# Patient Record
Sex: MALE | Race: WHITE | NOT HISPANIC OR LATINO | URBAN - NONMETROPOLITAN AREA
[De-identification: names, ages, dates, MRNs, and addresses within clinical notes are randomized per-mention and may not be internally consistent; named-entity substitution may affect disease eponyms.]

---

## 2017-01-03 ENCOUNTER — TRANSCRIBE ORDERS (OUTPATIENT)
Dept: PODIATRY | Facility: CLINIC | Age: 52
End: 2017-01-03

## 2017-01-03 DIAGNOSIS — L60.3 DYSTROPHIC NAIL: Primary | ICD-10-CM

## 2017-01-19 ENCOUNTER — OFFICE VISIT (OUTPATIENT)
Dept: PODIATRY | Facility: CLINIC | Age: 52
End: 2017-01-19

## 2017-01-19 VITALS
HEIGHT: 68 IN | SYSTOLIC BLOOD PRESSURE: 115 MMHG | HEART RATE: 88 BPM | BODY MASS INDEX: 27.1 KG/M2 | OXYGEN SATURATION: 97 % | DIASTOLIC BLOOD PRESSURE: 71 MMHG | WEIGHT: 178.8 LBS | TEMPERATURE: 98.8 F

## 2017-01-19 DIAGNOSIS — B35.1 ONYCHOMYCOSIS: Primary | ICD-10-CM

## 2017-01-19 DIAGNOSIS — B35.3 TINEA PEDIS OF BOTH FEET: ICD-10-CM

## 2017-01-19 DIAGNOSIS — M79.674 PAIN OF TOE OF RIGHT FOOT: ICD-10-CM

## 2017-01-19 PROCEDURE — 99203 OFFICE O/P NEW LOW 30 MIN: CPT | Performed by: PODIATRIST

## 2017-01-19 RX ORDER — TERBINAFINE HYDROCHLORIDE 250 MG/1
250 TABLET ORAL DAILY
Qty: 84 TABLET | Refills: 0 | Status: SHIPPED | OUTPATIENT
Start: 2017-01-19

## 2017-01-19 NOTE — PROGRESS NOTES
"Win Romero  1965  51 y.o. male   Patient presents today with an ingrown toenail on the right great toe.     01/19/2017  Chief Complaint   Patient presents with   • Right Foot - Ingrown Toenail           History of Present Illness    Mr Romero is a 52 y/o male who presents for evaluation of possible right hallux ingrown toe nail. He states that it has become increasingly thick and dystrophic over the past few years. It does cause some pain on pressure from shoe gear. He denies any acute inflammation or drainage. He does also have a history of athletes foot which failed OTC treatments and he has recently been given Rx topical cream. Any pain is relieved with rest.        No past medical history on file.      No past surgical history on file.      No family history on file.      Social History     Social History   • Marital status:      Spouse name: N/A   • Number of children: N/A   • Years of education: N/A     Occupational History   • Not on file.     Social History Main Topics   • Smoking status: Former Smoker     Types: Cigarettes, Cigars   • Smokeless tobacco: Not on file   • Alcohol use Not on file   • Drug use: Not on file   • Sexual activity: Not on file     Other Topics Concern   • Not on file     Social History Narrative   • No narrative on file         Current Outpatient Prescriptions   Medication Sig Dispense Refill   • terbinafine (lamiSIL) 250 MG tablet Take 1 tablet by mouth Daily. 84 tablet 0     No current facility-administered medications for this visit.          OBJECTIVE    Visit Vitals   • /71   • Pulse 88   • Temp 98.8 °F (37.1 °C)   • Ht 68\" (172.7 cm)   • Wt 178 lb 12.8 oz (81.1 kg)   • SpO2 97%   • BMI 27.19 kg/m2         Review of Systems   Constitutional:  Denies recent weight loss, weight gain, fever or chills, no change in exercise tolerance  Musculoskeletal: Toe pain.   Skin:  Thickened nails. Plantar foot rash.  Neurological: Denies " paresthesias.  Psychiatric/Behavioral: Denies depression    Physical Exam   Constitutional: he appears well-developed and well-nourished.   HEENT: Normocephalic. Atraumatic  CV: No tenderness. RRR  Resp: Non-labored respiration. No wheezes.   Psychiatric: he has a normal mood and affect. his   behavior is normal.      Lower Extremity Exam:  Vascular: DP/PT pulses palpable 2+.   Positive hair growth.   No edema  Toes warm  Neuro: Protective sensation diminished to lesser toes, b/l.  DTRs intact  Integument: No open wounds or lesions.  Skin quality normal  Scaly xerotic rash to plantar foot b/l in moccasin distribution  Musculoskeletal: LE muscle strength 5/5.   Gait normal  Nails 1-5 on right thickened, dystrophic, subungual debris. Very mild tenderness to right hallux nail              ASSESSMENT AND PLAN    Win was seen today for ingrown toenail.    Diagnoses and all orders for this visit:    Onychomycosis  -     Hepatic Function Panel; Future    Tinea pedis of both feet  -     Hepatic Function Panel; Future    Pain of toe of right foot    Other orders  -     terbinafine (lamiSIL) 250 MG tablet; Take 1 tablet by mouth Daily.    -Comprehensive foot and ankle exam performed  -Educated pt on diagnosis, etiology and treatment of onychomycosis/tinea pedis  -Pt interested in antifungal therapy  -LFTs ordered  -Rx Lamisil 250 mg daily for 12 weeks  -Recheck LFTs prior to next visit  -Recheck 6 weeks          This document has been electronically signed by Troy Wood DPM on January 22, 2017 12:07 PM     Troy Wood DPM  1/22/2017  11:45 AM

## 2017-06-30 ENCOUNTER — OFFICE VISIT (OUTPATIENT)
Dept: PODIATRY | Facility: CLINIC | Age: 52
End: 2017-06-30

## 2017-06-30 VITALS — BODY MASS INDEX: 26.98 KG/M2 | HEIGHT: 68 IN | WEIGHT: 178 LBS

## 2017-06-30 DIAGNOSIS — B35.3 TINEA PEDIS OF BOTH FEET: ICD-10-CM

## 2017-06-30 DIAGNOSIS — B35.1 ONYCHOMYCOSIS: Primary | ICD-10-CM

## 2017-06-30 PROCEDURE — 99213 OFFICE O/P EST LOW 20 MIN: CPT | Performed by: PODIATRIST

## 2019-01-24 ENCOUNTER — HOSPITAL ENCOUNTER (OUTPATIENT)
Facility: HOSPITAL | Age: 54
Setting detail: HOSPITAL OUTPATIENT SURGERY
End: 2019-01-24
Attending: INTERNAL MEDICINE | Admitting: INTERNAL MEDICINE

## 2021-04-09 ENCOUNTER — APPOINTMENT (OUTPATIENT)
Dept: VACCINE CLINIC | Facility: HOSPITAL | Age: 56
End: 2021-04-09